# Patient Record
Sex: MALE | Race: WHITE | NOT HISPANIC OR LATINO | URBAN - METROPOLITAN AREA
[De-identification: names, ages, dates, MRNs, and addresses within clinical notes are randomized per-mention and may not be internally consistent; named-entity substitution may affect disease eponyms.]

---

## 2020-03-09 ENCOUNTER — INPATIENT (INPATIENT)
Facility: HOSPITAL | Age: 55
LOS: 0 days | Discharge: HOME | DRG: 311 | End: 2020-03-10
Attending: HOSPITALIST | Admitting: HOSPITALIST
Payer: COMMERCIAL

## 2020-03-09 VITALS
DIASTOLIC BLOOD PRESSURE: 85 MMHG | TEMPERATURE: 98 F | HEART RATE: 111 BPM | HEIGHT: 71 IN | RESPIRATION RATE: 18 BRPM | OXYGEN SATURATION: 97 % | SYSTOLIC BLOOD PRESSURE: 144 MMHG | WEIGHT: 184.97 LBS

## 2020-03-09 PROCEDURE — 93010 ELECTROCARDIOGRAM REPORT: CPT

## 2020-03-09 PROCEDURE — 99291 CRITICAL CARE FIRST HOUR: CPT

## 2020-03-09 NOTE — ED ADULT NURSE NOTE - CHPI ED NUR SYMPTOMS NEG
no vomiting/no shortness of breath/no dizziness/no fever/no syncope/no congestion/no chills/no nausea/no diaphoresis

## 2020-03-09 NOTE — ED ADULT TRIAGE NOTE - CHIEF COMPLAINT QUOTE
left lung pain that started today went to urgent care had ekg completed and told to come to ED. PMHX of HTN denies dizziness or palpitations

## 2020-03-09 NOTE — ED ADULT NURSE NOTE - OBJECTIVE STATEMENT
Patient presents to ED sent from urgent care with complaints of substernal chest pain that began x 1 day. A/Ox4, VSS, denies chest pain or sob.  Respirations are even and unlabored, lungs cta, +bowel x4 quads, abdomen soft, nontender/nondistended, skin w/d/i.

## 2020-03-10 VITALS
RESPIRATION RATE: 18 BRPM | OXYGEN SATURATION: 96 % | HEART RATE: 86 BPM | DIASTOLIC BLOOD PRESSURE: 61 MMHG | TEMPERATURE: 99 F | SYSTOLIC BLOOD PRESSURE: 102 MMHG

## 2020-03-10 DIAGNOSIS — R91.8 OTHER NONSPECIFIC ABNORMAL FINDING OF LUNG FIELD: ICD-10-CM

## 2020-03-10 DIAGNOSIS — C81.90 HODGKIN LYMPHOMA, UNSPECIFIED, UNSPECIFIED SITE: ICD-10-CM

## 2020-03-10 DIAGNOSIS — Z98.890 OTHER SPECIFIED POSTPROCEDURAL STATES: Chronic | ICD-10-CM

## 2020-03-10 DIAGNOSIS — I24.9 ACUTE ISCHEMIC HEART DISEASE, UNSPECIFIED: ICD-10-CM

## 2020-03-10 LAB
ALBUMIN SERPL ELPH-MCNC: 3.8 G/DL — SIGNIFICANT CHANGE UP (ref 3.3–5.2)
ALP SERPL-CCNC: 111 U/L — SIGNIFICANT CHANGE UP (ref 40–120)
ALT FLD-CCNC: 52 U/L — HIGH
ANION GAP SERPL CALC-SCNC: 17 MMOL/L — SIGNIFICANT CHANGE UP (ref 5–17)
AST SERPL-CCNC: 37 U/L — SIGNIFICANT CHANGE UP
BILIRUB SERPL-MCNC: 0.6 MG/DL — SIGNIFICANT CHANGE UP (ref 0.4–2)
BUN SERPL-MCNC: 18 MG/DL — SIGNIFICANT CHANGE UP (ref 8–20)
CALCIUM SERPL-MCNC: 9.3 MG/DL — SIGNIFICANT CHANGE UP (ref 8.6–10.2)
CHLORIDE SERPL-SCNC: 98 MMOL/L — SIGNIFICANT CHANGE UP (ref 98–107)
CO2 SERPL-SCNC: 24 MMOL/L — SIGNIFICANT CHANGE UP (ref 22–29)
CREAT SERPL-MCNC: 0.85 MG/DL — SIGNIFICANT CHANGE UP (ref 0.5–1.3)
GLUCOSE SERPL-MCNC: 122 MG/DL — HIGH (ref 70–99)
HCT VFR BLD CALC: 43.5 % — SIGNIFICANT CHANGE UP (ref 39–50)
HGB BLD-MCNC: 14.8 G/DL — SIGNIFICANT CHANGE UP (ref 13–17)
LIDOCAIN IGE QN: 20 U/L — LOW (ref 22–51)
MAGNESIUM SERPL-MCNC: 2 MG/DL — SIGNIFICANT CHANGE UP (ref 1.8–2.6)
MCHC RBC-ENTMCNC: 32.6 PG — SIGNIFICANT CHANGE UP (ref 27–34)
MCHC RBC-ENTMCNC: 34 GM/DL — SIGNIFICANT CHANGE UP (ref 32–36)
MCV RBC AUTO: 95.8 FL — SIGNIFICANT CHANGE UP (ref 80–100)
NT-PROBNP SERPL-SCNC: 61 PG/ML — SIGNIFICANT CHANGE UP (ref 0–300)
PLATELET # BLD AUTO: 282 K/UL — SIGNIFICANT CHANGE UP (ref 150–400)
POTASSIUM SERPL-MCNC: 3.1 MMOL/L — LOW (ref 3.5–5.3)
POTASSIUM SERPL-SCNC: 3.1 MMOL/L — LOW (ref 3.5–5.3)
PROT SERPL-MCNC: 6.6 G/DL — SIGNIFICANT CHANGE UP (ref 6.6–8.7)
RBC # BLD: 4.54 M/UL — SIGNIFICANT CHANGE UP (ref 4.2–5.8)
RBC # FLD: 12.1 % — SIGNIFICANT CHANGE UP (ref 10.3–14.5)
SODIUM SERPL-SCNC: 139 MMOL/L — SIGNIFICANT CHANGE UP (ref 135–145)
TROPONIN T SERPL-MCNC: <0.01 NG/ML — SIGNIFICANT CHANGE UP (ref 0–0.06)
TROPONIN T SERPL-MCNC: <0.01 NG/ML — SIGNIFICANT CHANGE UP (ref 0–0.06)
TSH SERPL-MCNC: 1.06 UIU/ML — SIGNIFICANT CHANGE UP (ref 0.27–4.2)
WBC # BLD: 13.02 K/UL — HIGH (ref 3.8–10.5)
WBC # FLD AUTO: 13.02 K/UL — HIGH (ref 3.8–10.5)

## 2020-03-10 PROCEDURE — 85027 COMPLETE CBC AUTOMATED: CPT

## 2020-03-10 PROCEDURE — 71275 CT ANGIOGRAPHY CHEST: CPT

## 2020-03-10 PROCEDURE — 83735 ASSAY OF MAGNESIUM: CPT

## 2020-03-10 PROCEDURE — 71275 CT ANGIOGRAPHY CHEST: CPT | Mod: 26

## 2020-03-10 PROCEDURE — 83880 ASSAY OF NATRIURETIC PEPTIDE: CPT

## 2020-03-10 PROCEDURE — 99223 1ST HOSP IP/OBS HIGH 75: CPT

## 2020-03-10 PROCEDURE — 93005 ELECTROCARDIOGRAM TRACING: CPT

## 2020-03-10 PROCEDURE — 93010 ELECTROCARDIOGRAM REPORT: CPT

## 2020-03-10 PROCEDURE — 78452 HT MUSCLE IMAGE SPECT MULT: CPT | Mod: 26

## 2020-03-10 PROCEDURE — 12345: CPT | Mod: NC

## 2020-03-10 PROCEDURE — A9500: CPT

## 2020-03-10 PROCEDURE — 99285 EMERGENCY DEPT VISIT HI MDM: CPT | Mod: 25

## 2020-03-10 PROCEDURE — 93018 CV STRESS TEST I&R ONLY: CPT

## 2020-03-10 PROCEDURE — 93016 CV STRESS TEST SUPVJ ONLY: CPT

## 2020-03-10 PROCEDURE — 71045 X-RAY EXAM CHEST 1 VIEW: CPT

## 2020-03-10 PROCEDURE — 93306 TTE W/DOPPLER COMPLETE: CPT | Mod: 26

## 2020-03-10 PROCEDURE — 99234 HOSP IP/OBS SM DT SF/LOW 45: CPT

## 2020-03-10 PROCEDURE — 71045 X-RAY EXAM CHEST 1 VIEW: CPT | Mod: 26

## 2020-03-10 PROCEDURE — 83690 ASSAY OF LIPASE: CPT

## 2020-03-10 PROCEDURE — 76536 US EXAM OF HEAD AND NECK: CPT | Mod: 26

## 2020-03-10 PROCEDURE — 36415 COLL VENOUS BLD VENIPUNCTURE: CPT

## 2020-03-10 PROCEDURE — 99222 1ST HOSP IP/OBS MODERATE 55: CPT

## 2020-03-10 PROCEDURE — 84443 ASSAY THYROID STIM HORMONE: CPT

## 2020-03-10 PROCEDURE — 78452 HT MUSCLE IMAGE SPECT MULT: CPT

## 2020-03-10 PROCEDURE — 80053 COMPREHEN METABOLIC PANEL: CPT

## 2020-03-10 PROCEDURE — 93306 TTE W/DOPPLER COMPLETE: CPT

## 2020-03-10 PROCEDURE — 76536 US EXAM OF HEAD AND NECK: CPT

## 2020-03-10 PROCEDURE — 93017 CV STRESS TEST TRACING ONLY: CPT

## 2020-03-10 PROCEDURE — 84484 ASSAY OF TROPONIN QUANT: CPT

## 2020-03-10 PROCEDURE — 96374 THER/PROPH/DIAG INJ IV PUSH: CPT | Mod: XU

## 2020-03-10 RX ORDER — HYDROCHLOROTHIAZIDE 25 MG
25 TABLET ORAL DAILY
Refills: 0 | Status: DISCONTINUED | OUTPATIENT
Start: 2020-03-10 | End: 2020-03-10

## 2020-03-10 RX ORDER — POTASSIUM CHLORIDE 20 MEQ
10 PACKET (EA) ORAL ONCE
Refills: 0 | Status: COMPLETED | OUTPATIENT
Start: 2020-03-10 | End: 2020-03-10

## 2020-03-10 RX ORDER — ASPIRIN/CALCIUM CARB/MAGNESIUM 324 MG
325 TABLET ORAL DAILY
Refills: 0 | Status: DISCONTINUED | OUTPATIENT
Start: 2020-03-10 | End: 2020-03-10

## 2020-03-10 RX ORDER — POTASSIUM CHLORIDE 20 MEQ
40 PACKET (EA) ORAL ONCE
Refills: 0 | Status: COMPLETED | OUTPATIENT
Start: 2020-03-10 | End: 2020-03-10

## 2020-03-10 RX ORDER — LOSARTAN POTASSIUM 100 MG/1
50 TABLET, FILM COATED ORAL DAILY
Refills: 0 | Status: DISCONTINUED | OUTPATIENT
Start: 2020-03-10 | End: 2020-03-10

## 2020-03-10 RX ORDER — NITROGLYCERIN 6.5 MG
0.4 CAPSULE, EXTENDED RELEASE ORAL
Refills: 0 | Status: DISCONTINUED | OUTPATIENT
Start: 2020-03-10 | End: 2020-03-10

## 2020-03-10 RX ADMIN — Medication 100 MILLIEQUIVALENT(S): at 05:25

## 2020-03-10 RX ADMIN — Medication 325 MILLIGRAM(S): at 11:35

## 2020-03-10 RX ADMIN — Medication 25 MILLIGRAM(S): at 11:35

## 2020-03-10 RX ADMIN — LOSARTAN POTASSIUM 50 MILLIGRAM(S): 100 TABLET, FILM COATED ORAL at 11:35

## 2020-03-10 RX ADMIN — Medication 10 MILLIEQUIVALENT(S): at 06:50

## 2020-03-10 RX ADMIN — Medication 0.4 MILLIGRAM(S): at 03:28

## 2020-03-10 RX ADMIN — Medication 40 MILLIEQUIVALENT(S): at 05:25

## 2020-03-10 NOTE — DISCHARGE NOTE NURSING/CASE MANAGEMENT/SOCIAL WORK - PATIENT PORTAL LINK FT
You can access the FollowMyHealth Patient Portal offered by [facility] by registering at the following website: [email or web address]. By joining [facility]’s FollowMyHealth portal, you will also be able to view your health information using other applications (apps) compatible with our system.

## 2020-03-10 NOTE — ED PROVIDER NOTE - OBJECTIVE STATEMENT
53 y/o M, with Hx of distant Hodgkin's Lymphoma and HTN, presents with acute onset CP with radiation to back that began late yesterday afternoon. Pt states that pain started when [place] the train station to his office. However, pain persistent when he got home. Pt then went to urgent care, had abnormal EKG, and was thus referred to the ER for further evaluation. Pt currently notes chest discomfort, but denies SOB, N/V, fever, chills, back pain at this time, leg pain, or change in mental status. Pt also has distant Hx of smoking. 53 y/o M, with Hx of distant Hodgkin's Lymphoma and HTN, presents with acute onset CP with radiation to back that began late yesterday afternoon. Pt states that pain started when walking from the train station to his office. However, pain persistent when he got home. Tricia thought that he was developing walking pneumonia which he has had in the past but symptoms felt different this time. Pt then went to urgent care, had abnormal EKG, and was thus referred to the ER for further evaluation. Pt currently notes chest discomfort, but denies SOB, N/V, fever, chills, back pain at this time, leg pain, or change in mental status. Pt also has distant Hx of smoking.

## 2020-03-10 NOTE — CONSULT NOTE ADULT - ATTENDING COMMENTS
pt was seen and examined. pt with abnormal EKG and prior hx of radiation for HL.  Pt had CT ct with large left upper lung mass.  He also has thyroid nodule. Both above will need to be further evaluated by his PMD/oncologist.   Plan for stress test with abnormal EKG.  TTE pending  He understood of importance of close fu with oncologist and to get us of thyroid as out pt.  I agree with above a/p.

## 2020-03-10 NOTE — DISCHARGE NOTE PROVIDER - HOSPITAL COURSE
53 y/o M, with Hx of Hodgkin's Lymphoma and HTN comes to ED with cc of midsternal chest pain with radiation to back. Pt then went to urgent care, had abnormal EKG, and instructed to go to the ER for further evaluation.. Pt in ER CT show a Left upper lobe mass. Per pt no known mass in his hx. EKG show t wave inversions/abnormalities. Pt admitted with chest pain seen y pulmoary and cardio. patient had stress and tte whch were negtive and cleared for dc home        patient advised by multiple peiple to see his onc for follow up workup for his mass.         time spent on dc 32 moinutes

## 2020-03-10 NOTE — H&P ADULT - NSHPLABSRESULTS_GEN_ALL_CORE
14.8   13.02 )-----------( 282      ( 10 Mar 2020 03:02 )             43.5       03-10    139  |  98  |  18.0  ----------------------------<  122<H>  3.1<L>   |  24.0  |  0.85    Ca    9.3      10 Mar 2020 03:02  Mg     2.0     03-10    TPro  6.6  /  Alb  3.8  /  TBili  0.6  /  DBili  x   /  AST  37  /  ALT  52<H>  /  AlkPhos  111  03-10      CT chest with IV contrast:  IMPRESSION:      No evidence of pulmonary embolism followed to the segmental pulmonary arterial branches.    4.9 cm masslike consolidation in the left upper lobe. Differential considerations include infection and neoplasm. Correlation with prior imaging is suggested.    The findings were discussed with DR CERON  on 3/10/2020 5:34 AM.  Hospital policies for call back including read back policies were followed. The verbal communication call back supplements this written report.    GUILLERMO JENSEN M.D., ATTENDING RADIOLOGIST  This document has been electronically signed. Mar 10 2020  5:59AM        CXR:  FINDINGS:    The lungs  are clear.  No pleural abnormality is seen.    The heart and mediastinum are within normal limits.    Visualized osseous structures are intact.    IMPRESSION:   No evidence of active chest disease.    KRISTINA CHAMBERLAIN M.D., ATTENDING RADIOLOGIST  This document has been electronically signed. Mar 10 2020  7:54AM

## 2020-03-10 NOTE — CONSULT NOTE ADULT - ASSESSMENT
A/P:  55yo male with PMH Hodgkin's Lymphoma (1194) s/p chemo/radiation/Left lung surgery, HTN, Acid reflux.  Patient states around 1800 last night he began having upper mid sternal chest discomfort, "like indigestion, but higher up". Described as constant 5-6/10, no other associated symptoms or aggrevating/relieving factor, non-radiating at first then as the night went on progressed to mid upper back.  Went to urgent care and EKG was concerning so sent to ED.  Chest discomfort resolved after ED administration of nitro and potassium for hypokalemia. 2 weeks ago completed abx and prednisone for URI, still has mild lingering nonproductive cough. At baseline has a good functional capacity as he works in [employer] and walks at least 2mi/day.  Follows PMD/Oncologist Dr. Beard in [employer] regularly.     1. Chest pain  - tele monitoring  - EKG, no acute ischemia or arrhythmia though abnormal  - TTE prelim read, EF 60%, mild TR, no significant findings, official read pending  - NPO since 1900 yesterday, NST today    2. Hypokalemia  - repleat as necessary, per PMT  - repeat K  - discussed with patient importance of eating potassium rich food at home d/t his HCTZ    3. HTN  - c/w diovan/hctz    4. Lung mass  - discussed with patient importance of bringing radiology CD to Dr. Beard  - outpatient thyroid u/s    Preliminary evaluation, please await complete evaluation by Dr. Randhawa

## 2020-03-10 NOTE — ED PROVIDER NOTE - CRITICAL CARE ATTESTATION
Yes - the patient is able to be screened I have personally provided the amount of critical care time documented below excluding time spent on separate procedures

## 2020-03-10 NOTE — DISCHARGE NOTE PROVIDER - CARE PROVIDER_API CALL
Rick Randhawa)  Cardiovascular Disease  [address] [facility], [address] [place] [address]  Phone: [phone]  Fax: [phone]  Follow Up Time:     private onc,   Phone: (   )    -  Fax: (   )    -  Follow Up Time:

## 2020-03-10 NOTE — CONSULT NOTE ADULT - SUBJECTIVE AND OBJECTIVE BOX
[facility]                                                       [facility]                                                               Office:  [address] [place] [address]                                                              Telephone: [phone]. Fax:[phone]                                                                        CARDIOLOGY CONSULTATION NOTE                                                                                             Consult requested by:  Dr. Wagner  Reason for Consultation: Chest Pain  History obtained by: Patient and medical record   obtained: No    Chief complaint:    Patient is a 54y old  Male who presents with a chief complaint of chest pain (10 Mar 2020 08:19)        HPI:  53yo male with PMH Hodgkin's Lymphoma (1194) s/p chemo/radiation/Left lung surgery, HTN, Acid reflux.  Patient states around 1800 last night he began having upper mid sternal chest discomfort, "like indigestion, but higher up". Described as constant 5-6/10, no other associated symptoms or aggrevating/relieving factor, non-radiating at first then as the night went on progressed to mid upper back.  Went to urgent care and EKG was concerning so sent to ED.  Chest discomfort resolved after ED administration of nitro and potassium for hypokalemia. 2 weeks ago completed abx and prednisone for URI, still has mild lingering nonproductive cough. At baseline has a good functional capacity as he works in [employer] and walks at least 2mi/day.  Follows PMD/Oncologist Dr. Beard in [facility] regularly. Denies palpitations, irregular and/or rapid heart beat, SOB, VILLARREAL, syncope/near syncope, dizziness, orthopnea, PND, edema, n/v/d, hematuria, or hematochezia.         REVIEW OF SYMPTOMS:     CONSTITUTIONAL: No fever, weight loss, or fatigue  ENMT:  No difficulty hearing, tinnitus, vertigo; No sinus or throat pain  NECK: No pain or stiffness  CARDIOVASCULAR: as per HPI  RESPIRATORY: as per HPI  : No dysuria, no hematuria   GI: No dark color stool, no melena, no diarrhea, no constipation, no abdominal pain   NEURO: No headache, no dizziness, no slurred speech   MUSCULOSKELETAL: No joint pain or swelling; No muscle, back, or extremity pain  PSYCH: No agitation, no anxiety.    ALL OTHER REVIEW OF SYSTEMS ARE NEGATIVE.      PREVIOUS DIAGNOSTIC TESTING  none documented      ALLERGIES: Allergies    No Known Allergies    Intolerances          PAST MEDICAL HISTORY  Hodgkins lymphoma  Hodgkin disease in pediatric patient      PAST SURGICAL HISTORY  History of lung surgery  No significant past surgical history      FAMILY HISTORY:  Family history of lung cancer: Mother      SOCIAL HISTORY:    CIGARETTES: denies  ALCOHOL: social  DRUGS: denies      CURRENT MEDICATIONS:  hydrochlorothiazide 25 milliGRAM(s) Oral daily  losartan 50 milliGRAM(s) Oral daily  nitroglycerin     SubLingual 0.4 milliGRAM(s) SubLingual every 5 minutes PRN     aspirin        HOME MEDICATIONS:  Home Medications:  Diovan  mg-25 mg oral tablet: 1 tab(s) orally once a day (10 Mar 2020 08:15)      Vital Signs Last 24 Hrs  T(C): 36.6 (10 Mar 2020 08:04), Max: 36.9 (09 Mar 2020 22:29)  T(F): 97.9 (10 Mar 2020 08:04), Max: 98.5 (09 Mar 2020 22:29)  HR: 82 (10 Mar 2020 11:25) (82 - 111)  BP: 110/72 (10 Mar 2020 11:25) (110/70 - 144/85)  BP(mean): --  RR: 18 (10 Mar 2020 08:04) (18 - 19)  SpO2: 96% (10 Mar 2020 08:04) (95% - 97%)      PHYSICAL EXAM:  Constitutional: Comfortable. No acute distress.   HEENT: Atraumatic and normocephalic, neck is supple. No JVD. No carotid bruit. PEERL   CNS: A&Ox3. No focal deficits. EOMI. Cranial nerves II-IX are intact.   Lymph Nodes: Cervical: Not palpable.  Respiratory: CTAB  Cardiovascular: S1S2 RRR. No murmur/rubs or gallop.  Gastrointestinal: Soft non-tender and non distended. +Bowel sounds. Negative Buchanan's sign.  Extremities: No edema.   Psychiatric: Calm. No agitation.  Skin: No skin rash/ulcers visualized to face, hands or feet.    Intake and output:     LABS:                        14.8   13.02 )-----------( 282      ( 10 Mar 2020 03:02 )             43.5     03-10    139  |  98  |  18.0  ----------------------------<  122<H>  3.1<L>   |  24.0  |  0.85    Ca    9.3      10 Mar 2020 03:02  Mg     2.0     03-10    TPro  6.6  /  Alb  3.8  /  TBili  0.6  /  DBili  x   /  AST  37  /  ALT  52<H>  /  AlkPhos  111  03-10    CARDIAC MARKERS ( 10 Mar 2020 03:02 )  x     / <0.01 ng/mL / x     / x     / x        ;p-BNP=Serum Pro-Brain Natriuretic Peptide: 61 pg/mL (03-10 @ 03:02)          INTERPRETATION OF TELEMETRY: Sinus rhythm 80-90s  ECG: Sinus rhythm, ST-T wave abnormalities, T wave inversions inferolateral leads    RADIOLOGY & ADDITIONAL STUDIES:    X-ray:    < from: Xray Chest 1 View-PORTABLE IMMEDIATE (03.10.20 @ 03:56) >  INTERPRETATION:  Portable chest radiograph        CLINICAL INFORMATION: Chest pain    TECHNIQUE:  Portable  AP view of the chest was obtained.    COMPARISON: No previous examinations are available for review.    FINDINGS:    The lungs  are clear.  No pleural abnormality is seen.    The heart and mediastinum are within normal limits.    Visualized osseous structures are intact.        IMPRESSION:   No evidence of active chest disease.    < end of copied text >      CT scan:     < from: CT Angio Chest w/ IV Cont (03.10.20 @ 05:10) >  IMPRESSION:      No evidence of pulmonary embolism followed to the segmental pulmonary arterial branches.    4.9 cm masslike consolidation in the left upper lobe. Differential considerationsinclude infection and neoplasm. Correlation with prior imaging is suggested.    < end of copied text >  MRI:

## 2020-03-10 NOTE — H&P ADULT - NSHPPHYSICALEXAM_GEN_ALL_CORE
PHYSICAL EXAM:  Vital Signs Last 24 Hrs  T(C): 36.6 (10 Mar 2020 08:04), Max: 36.9 (09 Mar 2020 22:29)  T(F): 97.9 (10 Mar 2020 08:04), Max: 98.5 (09 Mar 2020 22:29)  HR: 83 (10 Mar 2020 08:04) (83 - 111)  BP: 116/80 (10 Mar 2020 08:04) (110/70 - 144/85)  BP(mean): --  RR: 18 (10 Mar 2020 08:04) (18 - 19)  SpO2: 96% (10 Mar 2020 08:04) (95% - 97%)    GENERAL: NAD, well-groomed, well-developed  HEAD:  Atraumatic, Normocephalic  EYES: EOMI, PERRLA, conjunctiva and sclera clear  ENMT: No tonsillar erythema, exudates, or enlargement; Moist mucous membranes  NERVOUS SYSTEM:  Alert & Oriented X3, Good concentration; Motor Strength 5/5 B/L upper and lower extremities   CHEST/LUNG: Clear to auscultation bilaterally; No rales, rhonchi, wheezing  HEART: Regular rate and rhythm; No murmurs  ABDOMEN: Soft, Nontender, Nondistended; Bowel sounds present  EXTREMITIES:  2+ Peripheral Pulses, No clubbing, cyanosis, or edema

## 2020-03-10 NOTE — CONSULT NOTE ADULT - ASSESSMENT
53 yo  with one day of chest pain radiating to back  Treated for possible sinusitis 2 weeks ago with steroids and antibiotics after urgent care visit    Hypertensive on meds  Distant smoker, no pulmon disease  Hx of Hodgkins in 1994 diagnosed by thoracic biopsy ot left mediastinum  Treated with chemo and then RT    Followed by Dr. Sosa closely ever since    Last CT chest in 90's ???  Last CXR perhaps five years ago for 'walking pneumonia'    No fever, but has cough and less chest pain now  WBC 13K  No other clues   CT reviewed and shows KADEEM mass, medially situated, ? age  Has linear infiltrate heading posteriorly which could represent RT changes--likely old    Cant r/o neoplasm, primary or secondary to treatment in this area  No good signs of infection or sputum    Pat had negative cardiac w/u, no evidence of dissection    He would prefer further w/u by his longtime physician in [place]  No objection to discharge to allow w/u there if OK with cardiology    Will discuss

## 2020-03-10 NOTE — CONSULT NOTE ADULT - SUBJECTIVE AND OBJECTIVE BOX
[facility]  INFECTIOUS DISEASES AND INTERNAL MEDICINE at [place]  =======================================================  Rah Peterson MD  Diplomates American Board of Internal Medicine and Infectious Diseases  =======================================================    [id number]  JANINE CARDONA     CC: chest pain     HPI:  53 y/o man with HTN and history of Hodgkin's Lymphoma in 1996 s/p chemo and XRT in remission, was admitted on 3/10 with chest pain with activity. He is getting work up for ACS but also had chest CT that showed a mass vs consolidation in KADEEM. He started having some cough this afternoon, no fever. Had pneumonia in the past that was treated as outpatient.   No recent CT, CXR or PET scan, last visit with oncologist was in 9/2019. Last imaging possibly about 4-5 years ago. Never been told that he has any mass in lung.   No recent travel, has a dog for 8years, no other pets. Works as a . No contact with chemicals or gases.     PAST MEDICAL & SURGICAL HISTORY:  Hodgkin's lymphoma  History of lung surgery    Social Hx: Ex-smoker, stopped after being diagnosed with lymphoma, no drugs or ETOH abuse    FAMILY HISTORY:  Family history of lung cancer: Mother      Allergies  No Known Allergies    Antibiotics:  None      REVIEW OF SYSTEMS:  CONSTITUTIONAL:  No Fever or chills  HEENT:  No diplopia or blurred vision.  No sore throat or runny nose.  CARDIOVASCULAR:  + chest pain, no SOB.  RESPIRATORY:  No cough, shortness of breath, PND or orthopnea.  GASTROINTESTINAL:  No nausea, vomiting or diarrhea.  GENITOURINARY:  No dysuria, frequency or urgency. No Blood in urine  MUSCULOSKELETAL:  no joint aches, no muscle pain  SKIN:  No change in skin, hair or nails.  NEUROLOGIC:  No paresthesias, fasciculations, seizures or weakness.  PSYCHIATRIC:  No disorder of thought or mood.  ENDOCRINE:  No heat or cold intolerance, polyuria or polydipsia.  HEMATOLOGICAL:  No easy bruising or bleeding.     Physical Exam:  Vital Signs Last 24 Hrs  T(C): 36.6 (10 Mar 2020 08:04), Max: 36.9 (09 Mar 2020 22:29)  T(F): 97.9 (10 Mar 2020 08:04), Max: 98.5 (09 Mar 2020 22:29)  HR: 82 (10 Mar 2020 11:25) (82 - 111)  BP: 110/72 (10 Mar 2020 11:25) (110/70 - 144/85)  RR: 18 (10 Mar 2020 08:04) (18 - 19)  SpO2: 96% (10 Mar 2020 08:04) (95% - 97%)  Height (cm): 180.34 (03-09 @ 22:29)  Weight (kg): 83.9 (03-09 @ 22:29)  BMI (kg/m2): 25.8 (03-09 @ 22:29)  BSA (m2): 2.04 (03-09 @ 22:29)  GEN: NAD  HEENT: normocephalic and atraumatic. EOMI. PERRL.    NECK: Supple.  No lymphadenopathy   LUNGS: Clear to auscultation.  HEART: Regular rate and rhythm without murmur.  ABDOMEN: Soft, nontender, and nondistended.  Positive bowel sounds.    : No CVA tenderness  EXTREMITIES: Without any cyanosis, clubbing, rash, lesions or edema.  NEUROLOGIC: grossly intact.  PSYCHIATRIC: Appropriate affect .  SKIN: No ulceration or induration present.    Labs:  03-10    139  |  98  |  18.0  ----------------------------<  122<H>  3.1<L>   |  24.0  |  0.85    Ca    9.3      10 Mar 2020 03:02  Mg     2.0     03-10    TPro  6.6  /  Alb  3.8  /  TBili  0.6  /  DBili  x   /  AST  37  /  ALT  52<H>  /  AlkPhos  111  03-10                        14.8   13.02 )-----------( 282      ( 10 Mar 2020 03:02 )             43.5     LIVER FUNCTIONS - ( 10 Mar 2020 03:02 )  Alb: 3.8 g/dL / Pro: 6.6 g/dL / ALK PHOS: 111 U/L / ALT: 52 U/L / AST: 37 U/L / GGT: x           CARDIAC MARKERS ( 10 Mar 2020 11:21 )  x     / <0.01 ng/mL / x     / x     / x      CARDIAC MARKERS ( 10 Mar 2020 03:02 )  x     / <0.01 ng/mL / x     / x     / x        RECENT CULTURES:  Pending     All imaging and other data have been reviewed.  < from: CT Angio Chest w/ IV Cont (03.10.20 @ 05:10) >   EXAM:  CT ANGIO CHEST (W)AW IC                        PROCEDURE DATE:  03/10/2020    INTERPRETATION:  CLINICAL INDICATION:    TECHNIQUE:  Spiral axial tomographic images were performed from the apex of the lungs to the domes of the diaphragm during the dynamic injection of intravenous contrast, as per pulmonary embolism protocol. MIP images were also obtained. 90 cc of Omnipaque 350 administered and 10 cc discarded.  FINDINGS:  Soft tissue windows:    The pulmonary artery branches are followed to the segmental divisions and appear patent without evidence of thrombi or filling defects.    Indeterminate 1.9 cm right thyroid nodule which can be better evaluated with ultrasound. There are no intrathoracic lymph nodes which meet CT criteria for enlargement. There is no significant cardiac chamber enlargement. The aorta and pulmonary artery are normal in size. There are coronary artery calcifications. There is no pleural or pericardial effusion.  The visualized portions of the upper abdomen demonstrates a subcentimeter hypodense lesion in the liver that is too small to characterize.  Lung windows:   There is a 3.2 x 4.9 cm masslike consolidation in the left upper lobe. The central bronchi are patent.  Bone windows: There is multilevel thoracic spondylosis.  IMPRESSION:    No evidence of pulmonary embolism followed to the segmental pulmonary arterial branches.  4.9 cm masslike consolidation in the left upper lobe. Differential considerationsinclude infection and neoplasm. Correlation with prior imaging is suggested.  The findings were discussed with DR CERON  on 3/10/2020 5:34 AM.  Hospital policies for call back including read back policies were followed. The verbal communication call back supplements this written report.      Assessment and Plan:   53 y/o man with HTN and history of Hodgkin's Lymphoma in 1996 s/p chemo and XRT in remission, was admitted on 3/10 with chest pain with activity. He is getting work up for ACS but also had chest CT that showed a mass vs consolidation in KADEEM. based radiologic findings, and no infiltrations in surroundings looks like a mass. He doens;t have any pneumonia symptoms.     R/O Pneumonia  Lung Mass  History of lymphoma    - Chest CT result noted  - Pulmonary consult has been called  - CT surgery or IR for possible biopsy   - Obtain old imaging from oncologist (patient is trying to talk to his oncologist).   - Trend WBC, slightly high 13k  - No antibiotic recommended at this time.  - Will send QuantiFeron test.     Will follow.    Case discussed with Dr. Wolf.

## 2020-03-10 NOTE — CONSULT NOTE ADULT - SUBJECTIVE AND OBJECTIVE BOX
PULMONARY CONSULT NOTE      [id number]    Patient is a 54y old  Male who presents with a chief complaint of chest pain (10 Mar 2020 15:03)  Hx of Hodgkins in 1994 treated with chemo and RT after thoracoscopy node biopsy    INTERVAL HPI/OVERNIGHT EVENTS:    MEDICATIONS  (STANDING):  aspirin 325 milliGRAM(s) Oral daily  hydrochlorothiazide 25 milliGRAM(s) Oral daily  losartan 50 milliGRAM(s) Oral daily      MEDICATIONS  (PRN):  nitroglycerin     SubLingual 0.4 milliGRAM(s) SubLingual every 5 minutes PRN Chest Pain      Allergies    No Known Allergies    Intolerances        PAST MEDICAL & SURGICAL HISTORY:  Hodgkins lymphoma  History of lung surgery      FAMILY HISTORY:  Family history of lung cancer: Mother      SOCIAL HISTORY  Smoking History:     REVIEW OF SYSTEMS:    CONSTITUTIONAL:  As per HPI.    HEENT:  Eyes:  No diplopia or blurred vision. ENT:  No earache, sore throat or runny nose.    CARDIOVASCULAR:  No pressure, squeezing, tightness, heaviness or aching about the chest; no palpitations.    RESPIRATORY:  No cough, shortness of breath, PND or orthopnea. Mild SOBOE    GASTROINTESTINAL:  No nausea, vomiting or diarrhea.    GENITOURINARY:  No dysuria, frequency or urgency.    MUSCULOSKELETAL:  No joint pains    SKIN:  No new lesions.    NEUROLOGIC:  No paresthesias, fasciculations, seizures or weakness.    PSYCHIATRIC:  No disorder of thought or mood.    ENDOCRINE:  No heat or cold intolerance, polyuria or polydipsia.    HEMATOLOGICAL:  No easy bruising or bleeding.     Vital Signs Last 24 Hrs  T(C): 36.6 (10 Mar 2020 08:04), Max: 36.9 (09 Mar 2020 22:29)  T(F): 97.9 (10 Mar 2020 08:04), Max: 98.5 (09 Mar 2020 22:29)  HR: 82 (10 Mar 2020 11:25) (82 - 111)  BP: 110/72 (10 Mar 2020 11:25) (110/70 - 144/85)  BP(mean): --  RR: 18 (10 Mar 2020 08:04) (18 - 19)  SpO2: 96% (10 Mar 2020 08:04) (95% - 97%)    PHYSICAL EXAMINATION:    GENERAL: The patient is a well-developed, well-nourished __male___in no apparent distress.     HEENT: Head is normocephalic and atraumatic. Extraocular muscles are intact. Mucous membranes are moist.     NECK: Supple.     LUNGS: Clear to auscultation without wheezing, rales, or rhonchi. Respirations unlabored    HEART: Regular rate and rhythm without murmur.    ABDOMEN: Soft, nontender, and nondistended.  No hepatosplenomegaly is noted.    EXTREMITIES: Without any cyanosis, clubbing, rash, lesions or edema.    NEUROLOGIC: Grossly intact.    SKIN: No ulceration or induration present.      LABS:                        14.8   13.02 )-----------( 282      ( 10 Mar 2020 03:02 )             43.5     03-10    139  |  98  |  18.0  ----------------------------<  122<H>  3.1<L>   |  24.0  |  0.85    Ca    9.3      10 Mar 2020 03:02  Mg     2.0     03-10    TPro  6.6  /  Alb  3.8  /  TBili  0.6  /  DBili  x   /  AST  37  /  ALT  52<H>  /  AlkPhos  111  03-10          CARDIAC MARKERS ( 10 Mar 2020 11:21 )  x     / <0.01 ng/mL / x     / x     / x      CARDIAC MARKERS ( 10 Mar 2020 03:02 )  x     / <0.01 ng/mL / x     / x     / x            Serum Pro-Brain Natriuretic Peptide: 61 pg/mL (03-10-20 @ 03:02)          MICROBIOLOGY:    RADIOLOGY & ADDITIONAL STUDIES:< from: CT Angio Chest w/ IV Cont (03.10.20 @ 05:10) >  NTERPRETATION:  CLINICAL INDICATION:      TECHNIQUE:    Spiral axial tomographic images were performed from the apex of the lungs to the domes of the diaphragm during the dynamic injection of intravenous contrast, as per pulmonary embolism protocol. MIP images were also obtained. 90 cc of Omnipaque 350 administered and 10 cc discarded.      FINDINGS:    Soft tissue windows:      The pulmonary artery branches are followed to the segmental divisions and appear patent without evidence of thrombi or filling defects.      Indeterminate 1.9 cm right thyroid nodule which can be better evaluated with ultrasound. There are no intrathoracic lymph nodes which meet CT criteria for enlargement. There is no significant cardiac chamber enlargement. The aorta and pulmonary artery are normal in size. There are coronary artery calcifications. There is no pleural or pericardial effusion.    The visualized portions of the upper abdomen demonstrates a subcentimeter hypodense lesion in the liver that is too small to characterize.      Lung windows:     There is a 3.2 x 4.9 cm masslike consolidation in the left upper lobe. The central bronchi are patent.      Bone windows: There is multilevel thoracic spondylosis.        IMPRESSION:      No evidence of pulmonary embolism followed to the segmental pulmonary arterial branches.    4.9 cm masslike consolidation in the left upper lobe. Differential considerationsinclude infection and neoplasm. Correlation with prior imaging is suggested.    The findings were discussed with DR CERON  on 3/10/2020 5:34 AM.  Hospital policies for call back including read back policies were followed. The verbal communication call back supplements this written report.        < end of copied text >  < from: Xray Chest 1 View-PORTABLE IMMEDIATE (03.10.20 @ 03:56) >  PROCEDURE DATE:  03/10/2020          INTERPRETATION:  Portable chest radiograph        CLINICAL INFORMATION: Chest pain    TECHNIQUE:  Portable  AP view of the chest was obtained.    COMPARISON: No previous examinations are available for review.    FINDINGS:    The lungs  are clear.  No pleural abnormality is seen.    The heart and mediastinum are within normal limits.    Visualized osseous structures are intact.        IMPRESSION:   No evidence of active chest disease.      < end of copied text >

## 2020-03-10 NOTE — ED PROVIDER NOTE - CARE PLAN
Principal Discharge DX:	ACS (acute coronary syndrome) Principal Discharge DX:	ACS (acute coronary syndrome)  Secondary Diagnosis:	Lung mass  Secondary Diagnosis:	Hypokalemia

## 2020-03-10 NOTE — H&P ADULT - HISTORY OF PRESENT ILLNESS
55 y/o M, with Hx of Hodgkin's Lymphoma and HTN comes to ED with cc of midsternal chest pain with radiation to back that began yesterday afternoon. Pt states that pain started when [place] the train station to his office. However, pain persistent when he got home. Patient thought that he was developing walking pneumonia which he has had in the past but symptoms felt different this time. Pt then went to urgent care, had abnormal EKG, and instructed to go to the ER for further evaluation. Per pt chest pain is a little better now. He denies SOB, N/V, fever, chills, back pain or palpitations. Pt in ER CT show a Left upper lobe mass. Per pt no known mass in his hx. EKG show t wave inversions/abnormalities. Pt admitted with chest pain and left upper lobe mass.

## 2020-03-10 NOTE — DISCHARGE NOTE PROVIDER - NSDCCPCAREPLAN_GEN_ALL_CORE_FT
PRINCIPAL DISCHARGE DIAGNOSIS  Diagnosis: ACS (acute coronary syndrome)  Assessment and Plan of Treatment:       SECONDARY DISCHARGE DIAGNOSES  Diagnosis: Hypokalemia  Assessment and Plan of Treatment:     Diagnosis: Lung mass  Assessment and Plan of Treatment:

## 2020-03-10 NOTE — ED PROVIDER NOTE - PROGRESS NOTE DETAILS
Reviewed the EKG from urgent care, pt has noted ST depressions in the Pericardial leads V2 to V6. Repeat ekg is as noted. EKG with slight depression compared to EKG from urgent care earlier today. Labs and CT are pending. radiologist contacted regarding upper left lobe findings suggestive of acute mass. Patient stable. Will admit for further evaluation of cardiac symptoms and possible further evaluation of mass. Patient resting comfortably. Discussed radiology findings, he  believes the mass is new but states he would need to check with his pmd since he has had some testing for the past few years.

## 2020-03-10 NOTE — H&P ADULT - ASSESSMENT
53 y/o M, with Hx of Hodgkin's Lymphoma and HTN comes to ED with cc of midsternal chest pain with radiation to back that began yesterday afternoon. Pt states that pain started when walking from the train station to his office. However, pain persistent when he got home. Patient thought that he was developing walking pneumonia which he has had in the past but symptoms felt different this time. Pt then went to urgent care, had abnormal EKG, and instructed to go to the ER for further evaluation. Per pt chest pain is a little better now. He denies SOB, no cough no sick contacts,  N/V, fever, chills, back pain or palpitations. Pt in ER CT show a Left upper lobe mass. Per pt no known mass in his hx. EKG show t wave inversions/abnormalities. Pt admitted with chest pain and left upper lobe mass.      1) Chest pain rule out ACS  - admit to tele  - aspirin daily  - 1st trop negative, check serial trop  - 2Decho ordered  - cardio SS consulted  - check A1c and fasting lipid    2) Left Upper lobe mass  - malignancy vs infectious process  - pulmonary consulted    3) Hypokalemia  - replenished with po KCL  - monitor level    4) Leukocytosis   - will monitor   - can be reactive to lung mass and chest pain    5) HTN  - valsartan not formulary, started losartan   - continue HCTZ  - monitor BP    6) Hx of Hodgkin's Lymphoma  - out pt follow up 55 y/o M, with Hx of Hodgkin's Lymphoma and HTN comes to ED with cc of midsternal chest pain with radiation to back that began yesterday afternoon. Pt states that pain started when walking from the train station to his office. However, pain persistent when he got home. Patient thought that he was developing walking pneumonia which he has had in the past but symptoms felt different this time. Pt then went to urgent care, had abnormal EKG, and instructed to go to the ER for further evaluation. Per pt chest pain is a little better now. He denies SOB, no cough no sick contacts,  N/V, fever, chills, back pain or palpitations. Pt in ER CT show a Left upper lobe mass. Per pt no known mass in his hx. EKG show t wave inversions/abnormalities. Pt admitted with chest pain and left upper lobe mass.      1) Chest pain rule out ACS  - admit to tele  - aspirin daily  - 1st trop negative, check serial trop  - 2Decho ordered  - cardio SS consulted  - check A1c and fasting lipid    2) Left Upper lobe mass  - malignancy vs infectious process  - pulmonary consulted    3) Hypokalemia  - replenished with po KCL  - monitor level    4) Leukocytosis   - will monitor   - can be reactive to lung mass and chest pain    5) Right thyroid nodlue 1.9cm on CT  - check TSH and thyroid u/s    6) HTN  - valsartan not formulary, started losartan   - continue HCTZ  - monitor BP    7) Hx of Hodgkin's Lymphoma  - out pt follow up
